# Patient Record
Sex: MALE
[De-identification: names, ages, dates, MRNs, and addresses within clinical notes are randomized per-mention and may not be internally consistent; named-entity substitution may affect disease eponyms.]

---

## 2023-12-14 ENCOUNTER — APPOINTMENT (OUTPATIENT)
Dept: SURGERY | Facility: CLINIC | Age: 73
End: 2023-12-14
Payer: MEDICARE

## 2023-12-14 VITALS
SYSTOLIC BLOOD PRESSURE: 178 MMHG | WEIGHT: 188 LBS | TEMPERATURE: 98.4 F | BODY MASS INDEX: 30.22 KG/M2 | HEART RATE: 79 BPM | DIASTOLIC BLOOD PRESSURE: 99 MMHG | OXYGEN SATURATION: 98 % | HEIGHT: 66 IN

## 2023-12-14 DIAGNOSIS — N40.0 BENIGN PROSTATIC HYPERPLASIA WITHOUT LOWER URINARY TRACT SYMPMS: ICD-10-CM

## 2023-12-14 DIAGNOSIS — E78.00 PURE HYPERCHOLESTEROLEMIA, UNSPECIFIED: ICD-10-CM

## 2023-12-14 DIAGNOSIS — I10 ESSENTIAL (PRIMARY) HYPERTENSION: ICD-10-CM

## 2023-12-14 DIAGNOSIS — Z80.9 FAMILY HISTORY OF MALIGNANT NEOPLASM, UNSPECIFIED: ICD-10-CM

## 2023-12-14 DIAGNOSIS — R19.09 OTHER INTRA-ABDOMINAL AND PELVIC SWELLING, MASS AND LUMP: ICD-10-CM

## 2023-12-14 PROBLEM — Z00.00 ENCOUNTER FOR PREVENTIVE HEALTH EXAMINATION: Status: ACTIVE | Noted: 2023-12-14

## 2023-12-14 PROCEDURE — 99204 OFFICE O/P NEW MOD 45 MIN: CPT

## 2023-12-14 RX ORDER — METFORMIN HYDROCHLORIDE 625 MG/1
TABLET ORAL
Refills: 0 | Status: ACTIVE | COMMUNITY

## 2023-12-14 RX ORDER — ATORVASTATIN CALCIUM 80 MG/1
TABLET, FILM COATED ORAL
Refills: 0 | Status: ACTIVE | COMMUNITY

## 2023-12-14 RX ORDER — ALFUZOSIN HYDROCHLORIDE 10 MG/1
TABLET, EXTENDED RELEASE ORAL
Refills: 0 | Status: ACTIVE | COMMUNITY

## 2023-12-14 RX ORDER — ASPIRIN 81 MG
81 TABLET, DELAYED RELEASE (ENTERIC COATED) ORAL
Refills: 0 | Status: ACTIVE | COMMUNITY

## 2023-12-14 NOTE — ASSESSMENT
[FreeTextEntry1] : Pt is a 73-year-old M with PMHx of HTN, HLD, diabetes (on metformin) and PSHx of prostate surgery who presents today for initial evaluation of possible inguinal hernia. On metformin, tamsulosin and atorvastatin. Patient comes in today complaining of bulge in the groin. Denies groin pain or discomfort. Upon physical examination, there was no inguinal hernia appreciated on either side of the groin, but possible fat noticed surrounding the pelvis area. Reassured the patient that there is no evidence of inguinal hernia bilaterally and suggested to incorporate exercise for maintainence of his complain.

## 2023-12-14 NOTE — REASON FOR VISIT
[Initial Evaluation] : an initial evaluation [Spouse] : spouse [FreeTextEntry1] : mass in the groin

## 2023-12-14 NOTE — PHYSICAL EXAM
[No Rash or Lesion] : No rash or lesion [Alert] : alert [Oriented to Person] : oriented to person [Oriented to Place] : oriented to place [Oriented to Time] : oriented to time [Calm] : calm [de-identified] : not in any acute distress [de-identified] : no inguinal hernia appreciated b/l  [de-identified] : functional MSK

## 2023-12-14 NOTE — END OF VISIT
[Time Spent: ___ minutes] : I have spent [unfilled] minutes of time on the encounter. [FreeTextEntry3] : All medical record entries made by the Scribe were at my, Dr. Andrea Vargas, direction and personally dictated by me on 12/14/23 . I have reviewed the chart and agree that the record accurately reflects my personal performance of the history, physical exam, assessment and plan. I have also personally directed, reviewed, and agreed with the chart.

## 2023-12-14 NOTE — ADDENDUM
[FreeTextEntry1] : Documented by Se Greenwood acting as a scribe for Dr. Andrea Vargas on 12/14/23.

## 2023-12-14 NOTE — HISTORY OF PRESENT ILLNESS
[de-identified] : Pt is a 73-year-old M with PMHx of HTN, HLD, diabetes (on metformin) and PSHx of prostate surgery who presents today for initial evaluation of possible inguinal hernia. On metformin, tamsulosin and atorvastatin. Patient comes in today complaining of bulge in the groin. Denies groin pain or discomfort. Upon physical examination, there was no inguinal hernia appreciated on either side of the groin, but possible fat noticed surrounding the pelvis area. Reassured the patient that there is no evidence of inguinal hernia bilaterally and suggested to incorporate exercise for maintainence of his complain.